# Patient Record
Sex: FEMALE | Race: WHITE | NOT HISPANIC OR LATINO | URBAN - NONMETROPOLITAN AREA
[De-identification: names, ages, dates, MRNs, and addresses within clinical notes are randomized per-mention and may not be internally consistent; named-entity substitution may affect disease eponyms.]

---
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
## 2020-08-06 NOTE — PATIENT DISCUSSION
Cool Compresses OU PRN.
Discussed importance of yearly eye exams and provided patient with literature.
Instructed to call immediately if any new distortion, blurring, decreased vision or eye pain.
MVI/AREDS discussed.
Monitor.
No Glasses Prescription given to patient.
No Retinal holes, Tears or Detachments.
Patient advised to call if any problems, questions, or concerns.
Patient educated on condition and contagious nature.
Patient educated on condition.
Patient elects to continue with OTCs.
Patient made aware of 24/7 emergency services.
Patient understands condition, prognosis and need for follow up care.
Resolved.
Retinal tear and detachment warning symptoms reviewed and patient instructed to call immediately if increasing floaters, flashes, or decreasing peripheral vision.
The cataract is creating some visual symptoms that are tolerable at this time.
Tobradex gtts QID for 4 days, then 2 times a day for 4 days then stop.
2 = assistive person

## 2020-09-29 NOTE — PATIENT DISCUSSION
10/9/2020 pt delcined PMN drops and informed medcon to erx commercial pharmacy drops to [facility]. [email or web address].

## 2021-10-21 ENCOUNTER — IMPORTED ENCOUNTER (OUTPATIENT)
Dept: URBAN - NONMETROPOLITAN AREA CLINIC 1 | Facility: CLINIC | Age: 21
End: 2021-10-21

## 2021-10-21 PROCEDURE — 92004 COMPRE OPH EXAM NEW PT 1/>: CPT

## 2021-10-21 PROCEDURE — 92015 DETERMINE REFRACTIVE STATE: CPT

## 2021-10-21 NOTE — PATIENT DISCUSSION
Myopia / Astigmatism OU- Discussed diagnosis in detail with patient - New glasses Rx given today- Continue to monitor- RTC 1 year complete

## 2022-04-09 ASSESSMENT — VISUAL ACUITY
OS_CC: 20/25-2
OD_CC: 20/29+2

## 2022-04-09 ASSESSMENT — TONOMETRY
OS_IOP_MMHG: 14
OD_IOP_MMHG: 16

## 2024-03-14 NOTE — PATIENT DISCUSSION
Post op gtt instructions reviewed with patient. Pt arrives via pov ambulatory with cane c/o bilateral leg burning with swelling. Pt reports cloudy urine. Pt requesting to check for cellulitis to the legs. Pt reports drainage. Arrives with bandage. Pt reports redness as well. Sees pain management for leg pain. Medication not helping for long. Pt states \"I think my depression is kicking in\". Tears rolls down face. Now reporting bleeding from my rectum.